# Patient Record
Sex: FEMALE | Race: WHITE | NOT HISPANIC OR LATINO | Employment: UNEMPLOYED | URBAN - NONMETROPOLITAN AREA
[De-identification: names, ages, dates, MRNs, and addresses within clinical notes are randomized per-mention and may not be internally consistent; named-entity substitution may affect disease eponyms.]

---

## 2020-07-02 ENCOUNTER — TELEMEDICINE (OUTPATIENT)
Dept: FAMILY MEDICINE CLINIC | Facility: CLINIC | Age: 20
End: 2020-07-02

## 2020-07-02 DIAGNOSIS — Z3A.28 28 WEEKS GESTATION OF PREGNANCY: ICD-10-CM

## 2020-07-02 DIAGNOSIS — Z67.31 TYPE AB BLOOD, RH NEGATIVE: Primary | ICD-10-CM

## 2020-07-02 PROCEDURE — 99203 OFFICE O/P NEW LOW 30 MIN: CPT | Performed by: NURSE PRACTITIONER

## 2020-07-02 NOTE — PROGRESS NOTES
Chief Complaint   Patient presents with   • Routine Prenatal Visit     needs rHogam injection ordered     Subjective   Nicolasa Trip Carroll is a 20 y.o. female who presents to the office for  Insurance preapproval for rHogam needed, and PA required.     The following portions of the patient's history were reviewed and updated as appropriate: allergies, current medications, past family history, past medical history, past social history, past surgical history and problem list.    History of Present Illness   Pregnancy gestational age 28 Wk 6 days today, EDC 9/18/2020. Patient of Dr Rubin Waters, OB/GYN MD. Due to her insurance this has not been obtained. Seeking order and PA for medication today.   History reviewed. No pertinent past medical history.     History reviewed. No pertinent family history.     Review of Systems   Constitutional: Negative.  Negative for fever and unexpected weight change.   HENT: Negative.    Eyes: Negative.    Respiratory: Negative.  Negative for cough, chest tightness and shortness of breath.    Cardiovascular: Negative.  Negative for chest pain.   Gastrointestinal: Negative.    Endocrine: Negative.    Genitourinary: Negative.  Negative for dysuria.   Musculoskeletal: Negative.    Skin: Negative.  Negative for color change, pallor, rash and wound.   Allergic/Immunologic: Negative.    Neurological: Negative.    Hematological: Negative.    Psychiatric/Behavioral: Negative.  Negative for sleep disturbance and suicidal ideas.   All other systems reviewed and are negative.      Objective   There were no vitals filed for this visit.  Physical Exam   Constitutional: She is oriented to person, place, and time. She appears well-developed and well-nourished. No distress.   Obviously gravid uterus by body habitus   HENT:   Head: Normocephalic and atraumatic.   Eyes: Pupils are equal, round, and reactive to light. Conjunctivae and EOM are normal. Right eye exhibits no discharge. Left eye exhibits no  discharge. No scleral icterus.   Neck: Normal range of motion. Neck supple. No tracheal deviation present.   Pulmonary/Chest: Effort normal. No stridor. No respiratory distress. She has no wheezes.   Musculoskeletal: Normal range of motion. She exhibits no edema, tenderness or deformity.   Neurological: She is alert and oriented to person, place, and time. No cranial nerve deficit.   Skin: Skin is dry. No rash noted. She is not diaphoretic. No erythema. No pallor.   Psychiatric: She has a normal mood and affect. Her behavior is normal. Judgment and thought content normal.       Assessment/Plan   Nicolasa was seen today for routine prenatal visit.    Diagnoses and all orders for this visit:    Type AB blood, Rh negative  -     Rhogam Immune Globulin Immunization; Standing    28 weeks gestation of pregnancy  -     Rhogam Immune Globulin Immunization; Standing         Estimated Due Date is 9/18/2020 Gestational Age Today is 28 week(s), 6 day(s) or 6.64 month(s) months. Date of Conception 12/27/2021         No flowsheet data found.    JUAN DIEGO Chavarria         Return if symptoms worsen or fail to improve.    Patient Instructions   [facility] will notify you when their shipment of rHoGam is obtained for injection asap.

## 2020-07-16 RX ORDER — FERROUS SULFATE 325(65) MG
325 TABLET ORAL DAILY
COMMUNITY

## 2020-07-17 NOTE — PATIENT INSTRUCTIONS
[facility] will notify you when their shipment of rHoGam is obtained for injection asap.

## 2022-07-01 RX ORDER — PREDNISONE 20 MG/1
20 TABLET ORAL 2 TIMES DAILY
Qty: 14 TABLET | Refills: 0 | Status: SHIPPED | OUTPATIENT
Start: 2022-07-01 | End: 2022-07-08

## 2022-07-01 RX ORDER — AZITHROMYCIN 250 MG/1
TABLET, FILM COATED ORAL
Qty: 6 TABLET | Refills: 0 | Status: SHIPPED | OUTPATIENT
Start: 2022-07-01